# Patient Record
Sex: FEMALE | Race: WHITE
[De-identification: names, ages, dates, MRNs, and addresses within clinical notes are randomized per-mention and may not be internally consistent; named-entity substitution may affect disease eponyms.]

---

## 2019-08-01 ENCOUNTER — HOSPITAL ENCOUNTER (EMERGENCY)
Dept: HOSPITAL 97 - ER | Age: 49
Discharge: HOME | End: 2019-08-01
Payer: COMMERCIAL

## 2019-08-01 DIAGNOSIS — F32.9: ICD-10-CM

## 2019-08-01 DIAGNOSIS — E07.9: ICD-10-CM

## 2019-08-01 DIAGNOSIS — R07.9: Primary | ICD-10-CM

## 2019-08-01 LAB
ALBUMIN SERPL BCP-MCNC: 3.6 G/DL (ref 3.4–5)
ALP SERPL-CCNC: 54 U/L (ref 45–117)
ALT SERPL W P-5'-P-CCNC: 13 U/L (ref 12–78)
AST SERPL W P-5'-P-CCNC: 8 U/L (ref 15–37)
BUN BLD-MCNC: 15 MG/DL (ref 7–18)
GLUCOSE SERPLBLD-MCNC: 99 MG/DL (ref 74–106)
HCT VFR BLD CALC: 37.6 % (ref 36–45)
INR BLD: 0.83
LIPASE SERPL-CCNC: 275 U/L (ref 73–393)
LYMPHOCYTES # SPEC AUTO: 1.4 K/UL (ref 0.7–4.9)
MAGNESIUM SERPL-MCNC: 2.2 MG/DL (ref 1.8–2.4)
NT-PROBNP SERPL-MCNC: 89 PG/ML (ref ?–125)
PMV BLD: 8.9 FL (ref 7.6–11.3)
POTASSIUM SERPL-SCNC: 3.9 MMOL/L (ref 3.5–5.1)
RBC # BLD: 4.05 M/UL (ref 3.86–4.86)
TROPONIN (EMERG DEPT USE ONLY): < 0.02 NG/ML (ref 0–0.04)

## 2019-08-01 PROCEDURE — 83690 ASSAY OF LIPASE: CPT

## 2019-08-01 PROCEDURE — 83735 ASSAY OF MAGNESIUM: CPT

## 2019-08-01 PROCEDURE — 96375 TX/PRO/DX INJ NEW DRUG ADDON: CPT

## 2019-08-01 PROCEDURE — 83880 ASSAY OF NATRIURETIC PEPTIDE: CPT

## 2019-08-01 PROCEDURE — 80048 BASIC METABOLIC PNL TOTAL CA: CPT

## 2019-08-01 PROCEDURE — 80076 HEPATIC FUNCTION PANEL: CPT

## 2019-08-01 PROCEDURE — 71021: CPT

## 2019-08-01 PROCEDURE — 99285 EMERGENCY DEPT VISIT HI MDM: CPT

## 2019-08-01 PROCEDURE — 84484 ASSAY OF TROPONIN QUANT: CPT

## 2019-08-01 PROCEDURE — 81025 URINE PREGNANCY TEST: CPT

## 2019-08-01 PROCEDURE — 71045 X-RAY EXAM CHEST 1 VIEW: CPT

## 2019-08-01 PROCEDURE — 96374 THER/PROPH/DIAG INJ IV PUSH: CPT

## 2019-08-01 PROCEDURE — 85610 PROTHROMBIN TIME: CPT

## 2019-08-01 PROCEDURE — 36415 COLL VENOUS BLD VENIPUNCTURE: CPT

## 2019-08-01 PROCEDURE — 81003 URINALYSIS AUTO W/O SCOPE: CPT

## 2019-08-01 PROCEDURE — 93005 ELECTROCARDIOGRAM TRACING: CPT

## 2019-08-01 PROCEDURE — 85025 COMPLETE CBC W/AUTO DIFF WBC: CPT

## 2019-08-01 NOTE — XMS REPORT
Clinical Summary

 Created on:2019



Patient:Nelsy Hall

Sex:Female

:1970

External Reference #:[id number]





Demographics







 Address  [address] [place] [address]

 

 Home Phone  [phone]

 

 Mobile Phone  [phone]

 

 Email Address  [email or web address]

 

 Preferred Language  English

 

 Marital Status  

 

 Confucianist Affiliation  Unknown

 

 Race  White

 

 Ethnic Group  Not  or 









Author







 Organization  [facility]

 

 Address  [address] [place] [id number]









Support







 Name  Relationship  Address  Phone

 

 Jon Hall  Unavailable  Unavailable  [phone]









Care Team Providers







 Name  Role  Phone

 

 Asked, No Pcp  Primary Care Provider  Unavailable









Allergies







 Active Allergy  Reactions  Severity  Noted Date  Comments

 

 Latex      2017  Break outs







Medications







 Medication  Sig  Dispensed  Refills  Start Date  End Date  Status

 

 sertraline (ZOLOFT) 50  100 mg daily.    1  2017    Active



 MG tablet            

 

 ARMOUR THYROID 120 mg      2  10/31/2017    Active



 tablet            







Active Problems







 Problem  Noted Date

 

 Aftercare following surgery  2017

 

 Not currently pregnant  2017

 

 Closed displaced fracture of middle phalanx of right ring finger  2017







Social History







 Tobacco Use  Types  Packs/Day  Years Used  Date

 

 Never Smoker        









 Smokeless Tobacco: Never Used      









 Alcohol Use  Drinks/Week  oz/Week  Comments

 

 Yes      rarely









 Sex Assigned at Birth  Date Recorded

 

 Not on file  









 Job Start Date  Occupation  Industry

 

 Not on file  Not on file  Not on file









 Travel History  Travel Start  Travel End









 No recent travel history available.







Last Filed Vital Signs

Not on file



Plan of Treatment







 Health Maintenance  Due Date  Last Done  Comments

 

 INFLUENZA VACCINE  2019    







Implants







 Implanted  Type  Area    Device  Shelf  Model /



         Identifier  Expiration  Serial /



           Date  Lot

 

 1.3mm Cortex Screw Slf-Tpng With T4 Stardrive Recess 8mm - [id number]  IPM 
IMPLANT  Left:  SYNTHES TRAUMA      02 130 008 /



 Implanted: Qty: 1 on 2017 by Pete Fink MD  [employer]  Hand         /

 

 1.3mm Cortex Screw Slf-Tpng With T4 Stardrive Recess 9mm - [id number]  IPM 
IMPLANT  Left:  SYNTHES TRAUMA      02 130 009 /



 Implanted: Qty: 1 on 2017 by Pete Fink MD  [employer]  Hand         /

 

 Wire K Trcr Pt Hand Surgy 0.8x70mm - [id number]  Temporary  Left:  SYNTHES 
TRAUMA      292 08 /



 Implanted: Qty: 1 on 2017 by Pete Fink MD  Fixation Pin  Hand  AND 
RECON       /



   or Wire          







Results

Not on fileafter 2018



Insurance







 Payer  Benefit Plan / Group  Subscriber ID  Effective Dates  Phone  Address  
Type

 

 BCBS  BCBS CHOICE PPO/FEDERAL EMPL  xxxxxxxxxxxx  2015-Present      PPO



   PPO          









 Guarantor Name  Account Type  Relation to  Date of Birth  Phone  Billing



     Patient      Address

 

 IsabelNelsy  Personal/Family  Self  1970  [phone]  [address] [place] [address]







         ([place])  [place] [address]







Advance Directives

Patient has advance care planning documents on file. For more information, 
please contact:[address] [place] [address]

## 2019-08-01 NOTE — RAD REPORT
EXAM DESCRIPTION:  Reymundo Single View8/1/2019 11:00 am

 

CLINICAL HISTORY:  Chest pain

 

COMPARISON:  none

 

FINDINGS:  1 centimeter nodular opacity overlies the left lung apex.

 

Right lung appears clear  of acute infiltrate. The heart is normal size

 

IMPRESSION:   1 centimeter nodular opacity overlying the left apex most likely representing calcified
 costal cartilage from the first left rib. A pulmonary nodule is another consideration. Is recommende
d that the patient have an apical lordotic chest x-ray for further evaluation

## 2019-08-01 NOTE — EKG
Test Date:    2019-08-01               Test Time:    10:15:19

Technician:   ALEX                                     

                                                     

MEASUREMENT RESULTS:                                       

Intervals:                                           

Rate:         69                                     

MI:           126                                    

QRSD:         82                                     

QT:           402                                    

QTc:          430                                    

Axis:                                                

P:            33                                     

MI:           126                                    

QRS:          48                                     

T:            41                                     

                                                     

INTERPRETIVE STATEMENTS:                                       

                                                     

Normal sinus rhythm

Normal ECG

No previous ECG available for comparison



Electronically Signed On 08-01-19 12:59:37 CDT by Piotr Holt

## 2019-08-01 NOTE — RAD REPORT
EXAM DESCRIPTION:

RAD - Chest W/ Apical Lordotic - 8/1/2019 1:00 pm

 

CLINICAL HISTORY:  Abnormal chest x-ray

 

FINDINGS:  An apical lordotic view obtained

 

Previously described nodular opacity overlying the left lung apex is shown to represent calcified cos
jonathan cartilage

## 2019-08-01 NOTE — EDPHYS
Physician Documentation                                                                           

 [facility]                                                                 

Name: Nelsy Hall                                                                                 

Age: 49 yrs                                                                                       

Sex: Female                                                                                       

: 1970                                                                                   

MRN: [id number]                                                                                   

Arrival Date: 2019                                                                          

Time: 09:51                                                                                       

Account#: [id number]                                                                            

Bed 26                                                                                            

Private MD: Arie Joseph                                                                         

ED Physician Jurgen Escalante                                                                       

HPI:                                                                                              

                                                                                             

10:55 This 49 yrs old  Female presents to ER via Ambulatory with complaints of       jmm 

      Vomiting, Jaw Pain, Hip Pain, Heart Burn.                                                   

10:55 The patient presents to the emergency department with nausea, vomiting. Onset: The      jmm 

      symptoms/episode began/occurred acutely, this morning. Possible causes: unknown. The        

      symptoms are aggravated by nothing. The symptoms are alleviated by antacids. Associated     

      signs and symptoms: Pertinent positives: abdominal pain, Pertinent negatives:. This is      

      a 49 year old female with a history of GERD, depression, hypothyroidism that presents       

      to the ED with complaints of burning chest pain with vomiting. Denies diarrhea. Chest       

      pain is described burning. Complains of mild shortness of breath. .                         

                                                                                                  

OB/GYN:                                                                                           

10:04 LMP 2019                                                                            aa5 

                                                                                                  

Historical:                                                                                       

- Allergies:                                                                                      

10:04 No Known Allergies;                                                                     aa5 

- Home Meds:                                                                                      

10:04 Zoloft Oral [Active]; Concord Thyroid 120 mg Oral tab daily [Active];                    aa5 

- PMHx:                                                                                           

10:04 Depression; Thyroid problem;                                                            aa5 

- PSHx:                                                                                           

10:04 skin surgery; Breast biopsy;                                                            aa5 

                                                                                                  

- Immunization history:: Adult Immunizations up to date.                                          

- Social history:: Smoking status: Patient/guardian denies using tobacco.                         

- Ebola Screening: : No symptoms or risks identified at this time.                                

                                                                                                  

                                                                                                  

ROS:                                                                                              

10:55 Constitutional: Negative for fever, chills, and weight loss.                            jmm 

10:55 Cardiovascular: Positive for chest pain.                                                    

10:55 Respiratory: Positive for shortness of breath.                                              

10:55 Abdomen/GI: Positive for vomiting.                                                          

10:55 All other systems are negative.                                                             

                                                                                                  

Exam:                                                                                             

10:15 ECG was reviewed by the Attending Physician.                                            jmm 

10:55 Head/Face:  atraumatic. Eyes:  EOMI, no conjunctival erythema appreciated ENT:  Moist   jmm 

      Mucus Membranes Neck:  Trachea midline, Supple Chest/axilla:  Normal chest wall             

      appearance and motion.                                                                      

10:55 Abdomen/GI:  Non distended, soft Back:  Normal ROM Skin:  General appearance color          

      normal MS/ Extremity:  Moves all extremities, no obvious deformities appreciated, no        

      edema noted to the lower extremities  Neuro:  Awake and alert, normal gait Psych:           

      Behavior is normal, Mood is normal, Patient is cooperative and pleasant                     

10:55 Constitutional: The patient appears in no acute distress, alert, awake.                     

10:55 Cardiovascular: Rate: normal, Rhythm: regular.                                              

10:55 Respiratory: the patient does not display signs of respiratory distress,  Respirations:     

      normal, Breath sounds: are clear throughout.                                                

                                                                                                  

Vital Signs:                                                                                      

10:04  / 70; Pulse 84; Resp 16 S; Temp 97.8(TE); Pulse Ox 99% on R/A; Weight 77.11 kg   aa5 

      (R); Height 5 ft. 2 in. (157.48 cm) (R); Pain 3/10;                                         

11:00  / 74; Pulse 63; Resp 17; Pulse Ox 100% on R/A;                                   tw2 

11:34 BP 99 / 67; Pulse 69; Resp 17; Pulse Ox 98% on R/A;                                     tw2 

12:25  / 63; Pulse 62; Resp 17; Pulse Ox 100% on R/A;                                   tw2 

13:17  / 70; Pulse 61; Resp 17; Pulse Ox 100% on R/A;                                   tw2 

10:04 Body Mass Index 31.09 (77.11 kg, 157.48 cm)                                             aa5 

                                                                                                  

MDM:                                                                                              

10:40 Patient medically screened.                                                             [facility] 

13:57 Data reviewed: vital signs, nurses notes. Counseling: I had a detailed discussion with  xavi 

      the patient and/or guardian regarding: the historical points, exam findings, and any        

      diagnostic results supporting the discharge/admit diagnosis, lab results, radiology         

      results, the need for outpatient follow up, to return to the emergency department if        

      symptoms worsen or persist or if there are any questions or concerns that arise at          

      home. ED course: Patient is alert and non toxic in appearance. Symptoms appear GI           

      related. HEART = 1. PERC SCORE NEGATIVE.                                                    

                                                                                                  

                                                                                             

10:38 Order name: Basic Metabolic Panel; Complete Time: 11:32                                 [facility] 

                                                                                             

10:38 Order name: CBC with Diff; Complete Time: 11:32                                         [facility] 

                                                                                             

10:38 Order name: LFT's; Complete Time: 11:32                                                 [facility] 

                                                                                             

10:38 Order name: Magnesium; Complete Time: 11:32                                             [facility] 

                                                                                             

10:38 Order name: NT PRO-BNP; Complete Time: 11:32                                            [facility] 

                                                                                             

10:38 Order name: PT-INR; Complete Time: 11:32                                                [facility] 

                                                                                             

10:38 Order name: Troponin (emerg Dept Use Only); Complete Time: 11:32                        [facility] 

                                                                                             

10:38 Order name: XRAY Chest (1 view); Complete Time: 11:32                                   [facility] 

                                                                                             

10:39 Order name: Lipase; Complete Time: 11:32                                                [facility] 

                                                                                             

10:43 Order name: Urine Dipstick--Ancillary (enter results); Complete Time: 10:47               

                                                                                             

10:43 Order name: Urine Pregnancy--Ancillary (enter results); Complete Time: 10:47              

                                                                                             

11:38 Order name: Chest W/ Apical Lordotic; Complete Time: 13:16                              [facility]

                                                                                             

10:37 Order name: Urine Dipstick-Ancillary (obtain specimen); Complete Time: 10:37              

                                                                                             

10:37 Order name: Urine Pregnancy Test (obtain specimen); Complete Time: 10:38                  

                                                                                             

10:38 Order name: EKG; Complete Time: 10:39                                                   [facility] 

                                                                                             

10:38 Order name: Cardiac monitoring; Complete Time: 10:45                                    [facility] 

                                                                                             

10:38 Order name: EKG - Nurse/Tech; Complete Time: 10:45                                      [facility] 

                                                                                             

10:38 Order name: IV Saline Lock; Complete Time: 10:45                                        [facility] 

                                                                                             

10:38 Order name: Labs collected and sent; Complete Time: 10:45                               [facility] 

                                                                                             

10:38 Order name: O2 Per Protocol; Complete Time: 10:45                                       [facility] 

                                                                                             

10:38 Order name: O2 Sat Monitoring; Complete Time: 10:45                                     jmm 

                                                                                                  

ECG:                                                                                              

10:15 Rate is 69 beats/min. Rhythm is regular. QRS Axis is Normal. MI interval is normal. QRS jmm 

      interval is normal. QT interval is normal. No Q waves. T waves are Normal. No ST            

      changes noted. Reviewed by me.                                                              

                                                                                                  

Administered Medications:                                                                         

11:07 Drug: Zofran 4 mg Route: IVP; Site: left antecubital;                                   tw2 

11:33 Follow up: Response: No adverse reaction                                                tw2 

11:09 Drug: Pepcid 20 mg Route: IVP; Site: right antecubital;                                 tw2 

11:33 Follow up: Response: No adverse reaction; Marked relief of symptoms                     tw2 

                                                                                                  

                                                                                                  

Disposition:                                                                                      

14:20 Co-signature as Attending Physician, Jurgen Escalante MD I agree with the assessment and   kdr 

      plan of care.                                                                               

                                                                                                  

Disposition:                                                                                      

19 13:59 Discharged to Home. Impression: Vomiting, Chest pain, unspecified.                 

- Condition is Stable.                                                                            

- Discharge Instructions: Nonspecific Chest Pain, Nausea and Vomiting, Adult.                     

- Prescriptions for Zofran ODT 4 mg Oral tablet,disintegrating - place 1 tablet by                

  TRANSLINGUAL route every 4-6 hours; 20 tablet. Prilosec 20 mg Oral Capsule - take 1             

  capsule by ORAL route once daily; 10 capsule.                                                   

- Medication Reconciliation Form, Thank You Letter, Antibiotic Education, Prescription            

  Opioid Use, Work release form, Family Work Release form.                                        

- Follow up: Arie Joseph MD; When: 2 - 3 days; Reason: Recheck today's complaints,             

  Continuance of care, Re-evaluation by your physician.                                           

                                                                                                  

                                                                                                  

                                                                                                  

Signatures:                                                                                       

Dispatcher [employer]                           [facility]                                                 

Jurgen Escalante MD MD   [facility]                                                  

Alex Araya PA PA   [facility]                                                  

Lacie Branch, RN                     RN   aa5                                                  

Love Simpson RN                      RN   ss                                                   

Vane Singer RN                          RN   tw2                                                  

                                                                                                  

Corrections: (The following items were deleted from the chart)                                    

11:38 11:35 Chest Single View+RAD.RAD.BRZ ordered. [facility]

14:06 13:59 2019 13:59 Discharged to Home. Impression: Vomiting; Chest pain,            tw2 

      unspecified. Condition is Stable. Forms are Work release form, Family Work Release,         

      Medication Reconciliation Form, Thank You Letter, Antibiotic Education, Prescription        

      Opioid Use. Follow up: Arie Joseph; When: 2 - 3 days; Reason: Recheck today's              

      complaints, Continuance of care, Re-evaluation by your physician. [facility]                       

                                                                                                  

**************************************************************************************************

## 2019-08-01 NOTE — ER
Nurse's Notes                                                                                     

 [facility]                                                                 

Name: Nelsy Hall                                                                                 

Age: 49 yrs                                                                                       

Sex: Female                                                                                       

: 1970                                                                                   

MRN: [id number]                                                                                   

Arrival Date: 2019                                                                          

Time: 09:51                                                                                       

Account#: [id number]                                                                            

Bed 26                                                                                            

Private MD: Arie Joseph                                                                         

Diagnosis: Vomiting;Chest pain, unspecified                                                       

                                                                                                  

Presentation:                                                                                     

                                                                                             

10:01 Presenting complaint: Patient states: "I woke up with extreme heart burn and then I     aa5 

      felt nauseated and started throwing up". Pt c/o epigastric pain, back pain, and ryan jaw     

      pain. Transition of care: patient was not received from another setting of care. Onset      

      of symptoms was 2019. Risk Assessment: Do you want to hurt yourself or someone       

      else? Patient reports no desire to harm self or others. Initial Sepsis Screen: Does the     

      patient meet any 2 criteria? No. Patient's initial sepsis screen is negative. Does the      

      patient have a suspected source of infection? No. Patient's initial sepsis screen is        

      negative. Care prior to arrival: None.                                                      

10:01 Method Of Arrival: Ambulatory                                                           aa5 

10:01 Acuity: KVNG 2                                                                           aa5 

                                                                                                  

OB/GYN:                                                                                           

10:04 LMP 2019                                                                            aa5 

                                                                                                  

Historical:                                                                                       

- Allergies:                                                                                      

10:04 No Known Allergies;                                                                     aa5 

- Home Meds:                                                                                      

10:04 Zoloft Oral [Active]; Gaastra Thyroid 120 mg Oral tab daily [Active];                    aa5 

- PMHx:                                                                                           

10:04 Depression; Thyroid problem;                                                            aa5 

- PSHx:                                                                                           

10:04 skin surgery; Breast biopsy;                                                            aa5 

                                                                                                  

- Immunization history:: Adult Immunizations up to date.                                          

- Social history:: Smoking status: Patient/guardian denies using tobacco.                         

- Ebola Screening: : No symptoms or risks identified at this time.                                

                                                                                                  

                                                                                                  

Screenin:35 Abuse screen: Denies threats or abuse. Nutritional screening: No deficits noted.        tw2 

      Tuberculosis screening: No symptoms or risk factors identified. Fall Risk None              

      identified.                                                                                 

                                                                                                  

Assessment:                                                                                       

10:30 General: Appears in no apparent distress. well groomed, Behavior is calm, cooperative,  tw2 

      appropriate for age. Pain: Complains of pain in "heartburn". Neuro: Level of                

      Consciousness is awake, alert, obeys commands, Oriented to person, place, time,             

      situation. Cardiovascular: Heart tones S1 S2 Patient's skin is warm and dry.                

      Respiratory: Airway is patent Respiratory effort is even, unlabored, Respiratory            

      pattern is regular, symmetrical, Breath sounds are clear bilaterally. GI: Abdomen is        

      flat, Bowel sounds present X 4 quads. Reports indigestion. : No signs and/or symptoms     

      were reported regarding the genitourinary system. EENT: No signs and/or symptoms were       

      reported regarding the EENT system. Derm: No signs and/or symptoms reported regarding       

      the dermatologic system. Musculoskeletal: Circulation, motion, and sensation intact.        

      Range of motion: intact in all extremities.                                                 

11:34 Reassessment: Patient appears in no apparent distress at this time. Patient and/or      tw2 

      family updated on plan of care and expected duration. Pain level reassessed. Patient is     

      alert, oriented x 3, equal unlabored respirations, skin warm/dry/pink. Patient states       

      feeling better. Patient states symptoms have improved.                                      

12:24 Reassessment: Patient appears in no apparent distress at this time. No changes from     tw2 

      previously documented assessment. Patient and/or family updated on plan of care and         

      expected duration. Pain level reassessed. Patient is alert, oriented x 3, equal             

      unlabored respirations, skin warm/dry/pink.                                                 

13:16 Reassessment: Patient appears in no apparent distress at this time. No changes from     tw2 

      previously documented assessment. Patient and/or family updated on plan of care and         

      expected duration. Pain level reassessed. Patient is alert, oriented x 3, equal             

      unlabored respirations, skin warm/dry/pink.                                                 

13:46 Reassessment: provider at bedside at this time.                                         tw2 

14:06 Reassessment: Patient appears in no apparent distress at this time. No changes from     tw2 

      previously documented assessment. Patient and/or family updated on plan of care and         

      expected duration. Pain level reassessed. Patient is alert, oriented x 3, equal             

      unlabored respirations, skin warm/dry/pink.                                                 

                                                                                                  

Vital Signs:                                                                                      

10:04  / 70; Pulse 84; Resp 16 S; Temp 97.8(TE); Pulse Ox 99% on R/A; Weight 77.11 kg   aa5 

      (R); Height 5 ft. 2 in. (157.48 cm) (R); Pain 3/10;                                         

11:00  / 74; Pulse 63; Resp 17; Pulse Ox 100% on R/A;                                   tw2 

11:34 BP 99 / 67; Pulse 69; Resp 17; Pulse Ox 98% on R/A;                                     tw2 

12:25  / 63; Pulse 62; Resp 17; Pulse Ox 100% on R/A;                                   tw2 

13:17  / 70; Pulse 61; Resp 17; Pulse Ox 100% on R/A;                                   tw2 

10:04 Body Mass Index 31.09 (77.11 kg, 157.48 cm)                                             aa5 

                                                                                                  

ED Course:                                                                                        

09:51 Patient arrived in ED.                                                                  ag5 

09:51 Arie Joseph MD is Private Physician.                                                 ag5 

10:01 Arm band placed on.                                                                     aa5 

10:03 Triage completed.                                                                       aa5 

10:14 EKG completed in triage. Results shown to MD.                                           ss  

10:18 EKG done, by EKG tech. reviewed by Jurgen Escalante MD.                                    sm3 

10:20 Placed in gown. Bed in low position. Adult w/ patient. Cardiac monitor on. Pulse ox on. tw2 

      NIBP on.                                                                                    

10:35 Aelx Araya PA is PHCP.                                                              [facility] 

10:35 Jurgen Escalante MD is Attending Physician.                                              jm 

10:36 Initial lab(s) drawn, by me, Urine collected: clean catch specimen, clear, davida        jb1 

      colored. Inserted saline lock: 22 gauge in left antecubital area, using aseptic             

      technique. Blood collected.                                                                 

10:49 Vane Singer, RN is Primary Nurse.                                                        tw2 

11:00 X-ray completed. Portable x-ray completed in exam room. Patient tolerated procedure     jb2 

      well.                                                                                       

11:01 XRAY Chest (1 view) In Process Unspecified.                                             EDMS

12:08 X-ray completed. Patient tolerated procedure well. Patient moved back from radiology.   jb2 

12:10 Chest W/ Apical Lordotic In Process Unspecified.                                        EDMS

13:59 Arie Joseph MD is Referral Physician.                                                jmm 

14:05 No provider procedures requiring assistance completed. IV discontinued, intact,         tw2 

      bleeding controlled, No redness/swelling at site. Pressure dressing applied.                

                                                                                                  

Administered Medications:                                                                         

11:07 Drug: Zofran 4 mg Route: IVP; Site: left antecubital;                                   tw2 

11:33 Follow up: Response: No adverse reaction                                                tw2 

11:09 Drug: Pepcid 20 mg Route: IVP; Site: right antecubital;                                 tw2 

11:33 Follow up: Response: No adverse reaction; Marked relief of symptoms                     tw2 

                                                                                                  

                                                                                                  

Outcome:                                                                                          

13:59 Discharge ordered by MD. hurley 

14:05 Discharged to home ambulatory, with significant other.                                  tw2 

14:05 Condition: stable                                                                           

14:05 Discharge instructions given to patient, significant other, Instructed on discharge         

      instructions, follow up and referral plans. medication usage, Demonstrated                  

      understanding of instructions, follow-up care, medications, Prescriptions given X 2.        

14:06 Patient left the ED.                                                                    tw 

                                                                                                  

Signatures:                                                                                       

Dispatcher [employer]                           EDMS                                                 

John Sears                                 jb1                                                  

Alex Araya PA PA jmm Buechter, Jesse jb2                                                  

Lacie Branch RN                     RN   aa5                                                  

Love Simpson RN RN ss Wise, Tara, RN                          RN   tw2                                                  

Angela Ellison                              3                                                  

Marcelo Khan                                5                                                  

                                                                                                  

Corrections: (The following items were deleted from the chart)                                    

10:12 10:01 Acuity: KVNG 3 aa5                                                                 aa5 

                                                                                                  

**************************************************************************************************